# Patient Record
(demographics unavailable — no encounter records)

---

## 2025-03-03 NOTE — ASSESSMENT
[FreeTextEntry1] : Headaches and poor gait. Certas  shunt valve changed from 6 to 5 today to further address hydrocephalus seen on his head CT from December 2024.  - repeat NCHCT in 1 month and then return to clinic, may consider dialing his shunt down further pending head CT results -gabapentin for headaches  Patient and patient's family verbalize agreement and understanding with plan of care.  I, Dr. Luigi Jorgensen, personally performed the evaluation and management (E/M) services for this established patient who presents today with (a) new problem(s)/exacerbation of (an) existing condition(s). That E/M includes conducting the clinically appropriate interval history &/or exam, assessing all new/exacerbated conditions, and establishing a new plan of care. Today, my MARIA DOLORES, Gareth Vieira, was here to observe my evaluation and management service for this new problem/exacerbated condition and follow the plan of care established by me going forward.

## 2025-03-03 NOTE — DATA REVIEWED
[de-identified] : ACC: [id number] EXAM: CT BRAIN ORDERED BY: DONOVAN STEVENS @ PACS 12/9/2024  PROCEDURE DATE: 12/09/2024    INTERPRETATION: CLINICAL INFORMATION: r/o hemorrhage  COMPARISON: CT head 11/30/2024. Prior brain MRI study from 6/27/2023.  CONTRAST: IV Contrast: NONE .  TECHNIQUE: Serial axial images were obtained from the skull base to the vertex using multi-slice helical technique. Sagittal and coronal reformats were obtained.  FINDINGS:  VENTRICLES AND SULCI: Stable ventricular enlargement with right-sided frontal approach  shunt catheter terminating in the anterior third ventricle. INTRA-AXIAL: No mass effect, acute hemorrhage, or midline shift. Chronic area of encephalomalacia and gliosis within the left corona radiata. EXTRA-AXIAL: No mass or fluid collection. Basal cisterns are normal in appearance.  VISUALIZED SINUSES: Clear. TYMPANOMASTOID CAVITIES: Clear. VISUALIZED ORBITS: Normal. CALVARIUM: Right frontal elvira hole.  IMPRESSION: Stable ventricular enlargement since 11/30/2024 status post placement of a right frontal approach  shunt catheter. No acute hemorrhage.  --- End of Report ---

## 2025-03-03 NOTE — HISTORY OF PRESENT ILLNESS
[FreeTextEntry1] : Hospital admission (6/26/23- 7/3/23)  67 yo M former smoker, poor historian, with PMH hydrocephalus (s/p ETV Dr. Jorgensen, 7/2019), CAD (s/p CABG x3, and multiple PCIs, most recently received HUMA to [facility] in 7/2020 @ [facility]), PAD (s/p aortic-femoral stent), CKD stage 3 (baseline Cr 2.0), HTN, HLD, hx of bleeding peptic ulcer in 2018, p/w R-sided headache for 7 days, unrelieved by Tylenol. He also is complaining of significant dizziness, gait instability, and subjective L-sided arm and leg weakness. Pt has had multiple ER visits over past years with similar complaints, MRI CSF flow study in 2021 demonstrated patent ETV. CTH with stable ventriculomegaly.  He underwent ETV on 6/28/23  At postoperative visit, he was referred to urologist for urinary retention. He returns today for one month postop visit.  HE was recently admitted to [facility] due to witnessed seizure. Per son, the patient lost consciousness and started convulsing. He called EMS. Imaging of the head (which is not available at today's visit) demonstrated a small intraparenchymal hemorrhage near the elvira hole. He was discharged on seizure medications (keppra 500 mg bid) and instructed to stop ASA 81 mg.  Since discharge, the patient reports generalized weakness and unsteady gait. He denies seizures since discharge. Reports headaches, which are slightly better since preop.  Recent hospitalization for severe headache, vestibular symptoms and gait disturbance. brain image showed ventriculo,egaly and communicating hydrocephalus. He underwent VPS placement. Post op hospital stay was complicated by pseudogout flare, abdominal pain/distension (images negative for any acute pathology), PT recommended acute rehab but patient prefers to go home. Patient was discharged to home  7/8/24 post op vsit Today he reports doing well at home and denies fever/chills, worsening focal neuro deficits. Plan was made to continue PT for gait training and repeat CTH wo in 3 months  8/23/24 post op visit Today he states persistent headache that are improved since prior visit but denies any other focal neuro deficits. has not been able to get CTH. he ran out of gabapentin which had been helping his headaches. Shunt settint checked at 7 today. Plan was made to take gabapentin for headache and obtain CTH asap and return for further treatment options.  10/11/24 follow up visit Today he reports worsening headache and more drowsy since his last visit. He tried gabapentin with minimal relief. CTH was reviewed which showed worsening hydrocephalus. Shunt setting changed to 6 due to worsening symptoms and image finding. Plan was made to repeat CTH in 6 weeks  12/9/2024: NO SHOW - was planned to return to review NCHCT performed on 11/30; demonstrates no change, dilated ventricles consistent with NPH.   TODAY: experiencing persistent vertigo issues for the last 1 month as well as binocular double vision. he is also reporting significant headache in the right parietal area. Reviewed NCHCT perform on 12/9; given reported symptoms, Certas changed to @ 5 today.
[FreeTextEntry1] : Hospital admission (6/26/23- 7/3/23)  67 yo M former smoker, poor historian, with PMH hydrocephalus (s/p ETV Dr. Jorgensen, 7/2019), CAD (s/p CABG x3, and multiple PCIs, most recently received HUMA to [facility] in 7/2020 @ [facility]), PAD (s/p aortic-femoral stent), CKD stage 3 (baseline Cr 2.0), HTN, HLD, hx of bleeding peptic ulcer in 2018, p/w R-sided headache for 7 days, unrelieved by Tylenol. He also is complaining of significant dizziness, gait instability, and subjective L-sided arm and leg weakness. Pt has had multiple ER visits over past years with similar complaints, MRI CSF flow study in 2021 demonstrated patent ETV. CTH with stable ventriculomegaly.  He underwent ETV on 6/28/23  At postoperative visit, he was referred to urologist for urinary retention. He returns today for one month postop visit.  HE was recently admitted to [facility] due to witnessed seizure. Per son, the patient lost consciousness and started convulsing. He called EMS. Imaging of the head (which is not available at today's visit) demonstrated a small intraparenchymal hemorrhage near the elvira hole. He was discharged on seizure medications (keppra 500 mg bid) and instructed to stop ASA 81 mg.  Since discharge, the patient reports generalized weakness and unsteady gait. He denies seizures since discharge. Reports headaches, which are slightly better since preop.  Recent hospitalization for severe headache, vestibular symptoms and gait disturbance. brain image showed ventriculo,egaly and communicating hydrocephalus. He underwent VPS placement. Post op hospital stay was complicated by pseudogout flare, abdominal pain/distension (images negative for any acute pathology), PT recommended acute rehab but patient prefers to go home. Patient was discharged to home  7/8/24 post op vsit Today he reports doing well at home and denies fever/chills, worsening focal neuro deficits. Plan was made to continue PT for gait training and repeat CTH wo in 3 months  8/23/24 post op visit Today he states persistent headache that are improved since prior visit but denies any other focal neuro deficits. has not been able to get CTH. he ran out of gabapentin which had been helping his headaches. Shunt settint checked at 7 today. Plan was made to take gabapentin for headache and obtain CTH asap and return for further treatment options.  10/11/24 follow up visit Today he reports worsening headache and more drowsy since his last visit. He tried gabapentin with minimal relief. CTH was reviewed which showed worsening hydrocephalus. Shunt setting changed to 6 due to worsening symptoms and image finding. Plan was made to repeat CTH in 6 weeks  12/9/2024: NO SHOW - was planned to return to review NCHCT performed on 11/30; demonstrates no change, dilated ventricles consistent with NPH.   TODAY: experiencing persistent vertigo issues for the last 1 month as well as binocular double vision. he is also reporting significant headache in the right parietal area. Reviewed NCHCT perform on 12/9; given reported symptoms, Certas changed to @ 5 today.
[FreeTextEntry1] : Hospital admission (6/26/23- 7/3/23)  69 yo M former smoker, poor historian, with PMH hydrocephalus (s/p ETV Dr. Jorgensen, 7/2019), CAD (s/p CABG x3, and multiple PCIs, most recently received HUMA to [facility] in 7/2020 @ [facility]), PAD (s/p aortic-femoral stent), CKD stage 3 (baseline Cr 2.0), HTN, HLD, hx of bleeding peptic ulcer in 2018, p/w R-sided headache for 7 days, unrelieved by Tylenol. He also is complaining of significant dizziness, gait instability, and subjective L-sided arm and leg weakness. Pt has had multiple ER visits over past years with similar complaints, MRI CSF flow study in 2021 demonstrated patent ETV. CTH with stable ventriculomegaly.  He underwent ETV on 6/28/23  At postoperative visit, he was referred to urologist for urinary retention. He returns today for one month postop visit.  HE was recently admitted to [facility] due to witnessed seizure. Per son, the patient lost consciousness and started convulsing. He called EMS. Imaging of the head (which is not available at today's visit) demonstrated a small intraparenchymal hemorrhage near the elvira hole. He was discharged on seizure medications (keppra 500 mg bid) and instructed to stop ASA 81 mg.  Since discharge, the patient reports generalized weakness and unsteady gait. He denies seizures since discharge. Reports headaches, which are slightly better since preop.  Recent hospitalization for severe headache, vestibular symptoms and gait disturbance. brain image showed ventriculo,egaly and communicating hydrocephalus. He underwent VPS placement. Post op hospital stay was complicated by pseudogout flare, abdominal pain/distension (images negative for any acute pathology), PT recommended acute rehab but patient prefers to go home. Patient was discharged to home  7/8/24 post op vsit Today he reports doing well at home and denies fever/chills, worsening focal neuro deficits. Plan was made to continue PT for gait training and repeat CTH wo in 3 months  8/23/24 post op visit Today he states persistent headache that are improved since prior visit but denies any other focal neuro deficits. has not been able to get CTH. he ran out of gabapentin which had been helping his headaches. Shunt settint checked at 7 today. Plan was made to take gabapentin for headache and obtain CTH asap and return for further treatment options.  10/11/24 follow up visit Today he reports worsening headache and more drowsy since his last visit. He tried gabapentin with minimal relief. CTH was reviewed which showed worsening hydrocephalus. Shunt setting changed to 6 due to worsening symptoms and image finding. Plan was made to repeat CTH in 6 weeks  12/9/2024: NO SHOW - was planned to return to review NCHCT performed on 11/30; demonstrates no change, dilated ventricles consistent with NPH.   TODAY: experiencing persistent vertigo issues for the last 1 month as well as binocular double vision. he is also reporting significant headache in the right parietal area. Reviewed NCHCT perform on 12/9; given reported symptoms, Certas changed to @ 5 today.
14-Aug-2018

## 2025-03-03 NOTE — REASON FOR VISIT
[Follow-Up: _____] : a [unfilled] follow-up visit [FreeTextEntry1] : history of communicating hydrocephalus s/p VPS (Certas, last setting @ 6) [FreeTextEntry2] : 6/13/24

## 2025-03-03 NOTE — DATA REVIEWED
[de-identified] : ACC: [id number] EXAM: CT BRAIN ORDERED BY: DONOVAN STEVENS @ PACS 12/9/2024  PROCEDURE DATE: 12/09/2024    INTERPRETATION: CLINICAL INFORMATION: r/o hemorrhage  COMPARISON: CT head 11/30/2024. Prior brain MRI study from 6/27/2023.  CONTRAST: IV Contrast: NONE .  TECHNIQUE: Serial axial images were obtained from the skull base to the vertex using multi-slice helical technique. Sagittal and coronal reformats were obtained.  FINDINGS:  VENTRICLES AND SULCI: Stable ventricular enlargement with right-sided frontal approach  shunt catheter terminating in the anterior third ventricle. INTRA-AXIAL: No mass effect, acute hemorrhage, or midline shift. Chronic area of encephalomalacia and gliosis within the left corona radiata. EXTRA-AXIAL: No mass or fluid collection. Basal cisterns are normal in appearance.  VISUALIZED SINUSES: Clear. TYMPANOMASTOID CAVITIES: Clear. VISUALIZED ORBITS: Normal. CALVARIUM: Right frontal elvira hole.  IMPRESSION: Stable ventricular enlargement since 11/30/2024 status post placement of a right frontal approach  shunt catheter. No acute hemorrhage.  --- End of Report ---

## 2025-03-03 NOTE — DATA REVIEWED
[de-identified] : ACC: [id number] EXAM: CT BRAIN ORDERED BY: DONOVAN STEVENS @ PACS 12/9/2024  PROCEDURE DATE: 12/09/2024    INTERPRETATION: CLINICAL INFORMATION: r/o hemorrhage  COMPARISON: CT head 11/30/2024. Prior brain MRI study from 6/27/2023.  CONTRAST: IV Contrast: NONE .  TECHNIQUE: Serial axial images were obtained from the skull base to the vertex using multi-slice helical technique. Sagittal and coronal reformats were obtained.  FINDINGS:  VENTRICLES AND SULCI: Stable ventricular enlargement with right-sided frontal approach  shunt catheter terminating in the anterior third ventricle. INTRA-AXIAL: No mass effect, acute hemorrhage, or midline shift. Chronic area of encephalomalacia and gliosis within the left corona radiata. EXTRA-AXIAL: No mass or fluid collection. Basal cisterns are normal in appearance.  VISUALIZED SINUSES: Clear. TYMPANOMASTOID CAVITIES: Clear. VISUALIZED ORBITS: Normal. CALVARIUM: Right frontal elvira hole.  IMPRESSION: Stable ventricular enlargement since 11/30/2024 status post placement of a right frontal approach  shunt catheter. No acute hemorrhage.  --- End of Report ---

## 2025-03-24 NOTE — DATA REVIEWED
[de-identified] : 3/11/25 @ PACS ACC: [id number] EXAM: CT BRAIN ORDERED BY: KOLTON SHORT  PROCEDURE DATE: 03/11/2025    INTERPRETATION: CLINICAL INFORMATION: headaches, weakness. hx hydrocephalus w  shunt  COMPARISON: CT head 12/9/2024  CONTRAST: IV Contrast: NONE .  TECHNIQUE: Serial axial images were obtained from the skull base to the vertex using multi-slice helical technique. Sagittal and coronal reformats were obtained.  FINDINGS:  VENTRICLES AND SULCI: Stable ventricular enlargement with right-sided frontal approach  shunt catheter terminating in the anterior third ventricle. INTRA-AXIAL: No mass effect, acute hemorrhage, or midline shift. Chronic area of encephalomalacia/gliosis within the left corona radiata. No evidence of transependymal flow of CSF. EXTRA-AXIAL: No mass or fluid collection. Basal cisterns are normal in appearance.  VISUALIZED SINUSES: Clear. TYMPANOMASTOID CAVITIES: Clear. VISUALIZED ORBITS: Normal. CALVARIUM: Right frontal elvira hole  IMPRESSION: Unchanged trajectory and positioning of a right frontal approach intraventricular catheter. Ventriculomegaly is unchanged from the prior study of 12/9/2024. No acute intracranial hemorrhage, midline shift or infarct.   --- End of Report ---

## 2025-03-24 NOTE — HISTORY OF PRESENT ILLNESS
[FreeTextEntry1] : Hospital admission (6/26/23- 7/3/23)  69 yo M former smoker, poor historian, with PMH hydrocephalus (s/p ETV Dr. Jorgensen, 7/2019), CAD (s/p CABG x3, and multiple PCIs, most recently received HUMA to [facility] in 7/2020 @ [facility]), PAD (s/p aortic-femoral stent), CKD stage 3 (baseline Cr 2.0), HTN, HLD, hx of bleeding peptic ulcer in 2018, p/w R-sided headache for 7 days, unrelieved by Tylenol. He also is complaining of significant dizziness, gait instability, and subjective L-sided arm and leg weakness. Pt has had multiple ER visits over past years with similar complaints, MRI CSF flow study in 2021 demonstrated patent ETV. CTH with stable ventriculomegaly.  He underwent ETV on 6/28/23  At postoperative visit, he was referred to urologist for urinary retention. He returns today for one month postop visit.  HE was recently admitted to [facility] due to witnessed seizure. Per son, the patient lost consciousness and started convulsing. He called EMS. Imaging of the head (which is not available at today's visit) demonstrated a small intraparenchymal hemorrhage near the elvira hole. He was discharged on seizure medications (keppra 500 mg bid) and instructed to stop ASA 81 mg.  Since discharge, the patient reports generalized weakness and unsteady gait. He denies seizures since discharge. Reports headaches, which are slightly better since preop.  Recent hospitalization for severe headache, vestibular symptoms and gait disturbance. brain image showed ventriculo,egaly and communicating hydrocephalus. He underwent VPS placement. Post op hospital stay was complicated by pseudogout flare, abdominal pain/distension (images negative for any acute pathology), PT recommended acute rehab but patient prefers to go home. Patient was discharged to home  7/8/24 post op vsit Today he reports doing well at home and denies fever/chills, worsening focal neuro deficits. Plan was made to continue PT for gait training and repeat CTH wo in 3 months  8/23/24 post op visit Today he states persistent headache that are improved since prior visit but denies any other focal neuro deficits. has not been able to get CTH. he ran out of gabapentin which had been helping his headaches. Shunt settint checked at 7 today. Plan was made to take gabapentin for headache and obtain CTH asap and return for further treatment options.  10/11/24 follow up visit Today he reports worsening headache and more drowsy since his last visit. He tried gabapentin with minimal relief. CTH was reviewed which showed worsening hydrocephalus. Shunt setting changed to 6 due to worsening symptoms and image finding. Plan was made to repeat CTH in 6 weeks  12/9/2024: NO SHOW - was planned to return to review NCHCT performed on 11/30; demonstrates no change, dilated ventricles consistent with NPH.  3/5/2025 experiencing persistent vertigo issues for the last 1 month as well as binocular double vision. he is also reporting significant headache in the right parietal area. Reviewed NCHCT perform on 12/9; given reported symptoms, Certas changed to @ 5 today.  TODAY:

## 2025-05-19 NOTE — ASSESSMENT
[FreeTextEntry1] : Certas changed to 4.   - repeat NCHCT in 1 month (June 2025) - gabapentin refill sent - referral to physiatry provided for steroid injections to the knee - RTC once above imaging completed  Patient and patient's family verbalize agreement and understanding with plan of care.  I, Dr. Luigi Jorgensen, personally performed the evaluation and management (E/M) services for this established patient who presents today with (a) new problem(s)/exacerbation of (an) existing condition(s). That E/M includes conducting the clinically appropriate interval history &/or exam, assessing all new/exacerbated conditions, and establishing a new plan of care. Today, my MARIA DOLORES, Gareth Vieira, was here to observe my evaluation and management service for this new problem/exacerbated condition and follow the plan of care established by me going forward.

## 2025-05-19 NOTE — DATA REVIEWED
[de-identified] : 3/11/25 @ PACS ACC: [id number] EXAM: CT BRAIN ORDERED BY: KOLTON SHORT  PROCEDURE DATE: 03/11/2025    INTERPRETATION: CLINICAL INFORMATION: headaches, weakness. hx hydrocephalus w  shunt  COMPARISON: CT head 12/9/2024  CONTRAST: IV Contrast: NONE .  TECHNIQUE: Serial axial images were obtained from the skull base to the vertex using multi-slice helical technique. Sagittal and coronal reformats were obtained.  FINDINGS:  VENTRICLES AND SULCI: Stable ventricular enlargement with right-sided frontal approach  shunt catheter terminating in the anterior third ventricle. INTRA-AXIAL: No mass effect, acute hemorrhage, or midline shift. Chronic area of encephalomalacia/gliosis within the left corona radiata. No evidence of transependymal flow of CSF. EXTRA-AXIAL: No mass or fluid collection. Basal cisterns are normal in appearance.  VISUALIZED SINUSES: Clear. TYMPANOMASTOID CAVITIES: Clear. VISUALIZED ORBITS: Normal. CALVARIUM: Right frontal elvira hole  IMPRESSION: Unchanged trajectory and positioning of a right frontal approach intraventricular catheter. Ventriculomegaly is unchanged from the prior study of 12/9/2024. No acute intracranial hemorrhage, midline shift or infarct.   --- End of Report ---

## 2025-05-19 NOTE — DATA REVIEWED
[de-identified] : 3/11/25 @ PACS ACC: [id number] EXAM: CT BRAIN ORDERED BY: KOLTON SHORT  PROCEDURE DATE: 03/11/2025    INTERPRETATION: CLINICAL INFORMATION: headaches, weakness. hx hydrocephalus w  shunt  COMPARISON: CT head 12/9/2024  CONTRAST: IV Contrast: NONE .  TECHNIQUE: Serial axial images were obtained from the skull base to the vertex using multi-slice helical technique. Sagittal and coronal reformats were obtained.  FINDINGS:  VENTRICLES AND SULCI: Stable ventricular enlargement with right-sided frontal approach  shunt catheter terminating in the anterior third ventricle. INTRA-AXIAL: No mass effect, acute hemorrhage, or midline shift. Chronic area of encephalomalacia/gliosis within the left corona radiata. No evidence of transependymal flow of CSF. EXTRA-AXIAL: No mass or fluid collection. Basal cisterns are normal in appearance.  VISUALIZED SINUSES: Clear. TYMPANOMASTOID CAVITIES: Clear. VISUALIZED ORBITS: Normal. CALVARIUM: Right frontal elvira hole  IMPRESSION: Unchanged trajectory and positioning of a right frontal approach intraventricular catheter. Ventriculomegaly is unchanged from the prior study of 12/9/2024. No acute intracranial hemorrhage, midline shift or infarct.   --- End of Report ---

## 2025-05-19 NOTE — HISTORY OF PRESENT ILLNESS
[FreeTextEntry1] : Hospital admission (6/26/23- 7/3/23)  69 yo M former smoker, poor historian, with PMH hydrocephalus (s/p ETV Dr. Jorgensen, 7/2019), CAD (s/p CABG x3, and multiple PCIs, most recently received HUMA to [facility] in 7/2020 @ [facility]), PAD (s/p aortic-femoral stent), CKD stage 3 (baseline Cr 2.0), HTN, HLD, hx of bleeding peptic ulcer in 2018, p/w R-sided headache for 7 days, unrelieved by Tylenol. He also is complaining of significant dizziness, gait instability, and subjective L-sided arm and leg weakness. Pt has had multiple ER visits over past years with similar complaints, MRI CSF flow study in 2021 demonstrated patent ETV. CTH with stable ventriculomegaly.  He underwent ETV on 6/28/23  At postoperative visit, he was referred to urologist for urinary retention. He returns today for one month postop visit.  HE was recently admitted to [place] due to witnessed seizure. Per son, the patient lost consciousness and started convulsing. He called EMS. Imaging of the head (which is not available at today's visit) demonstrated a small intraparenchymal hemorrhage near the elvira hole. He was discharged on seizure medications (keppra 500 mg bid) and instructed to stop ASA 81 mg.  Since discharge, the patient reports generalized weakness and unsteady gait. He denies seizures since discharge. Reports headaches, which are slightly better since preop.  Recent hospitalization for severe headache, vestibular symptoms and gait disturbance. brain image showed ventriculo,egaly and communicating hydrocephalus. He underwent VPS placement. Post op hospital stay was complicated by pseudogout flare, abdominal pain/distension (images negative for any acute pathology), PT recommended acute rehab but patient prefers to go home. Patient was discharged to home  7/8/24 post op vsit Today he reports doing well at home and denies fever/chills, worsening focal neuro deficits. Plan was made to continue PT for gait training and repeat CTH wo in 3 months  8/23/24 post op visit Today he states persistent headache that are improved since prior visit but denies any other focal neuro deficits. has not been able to get CTH. he ran out of gabapentin which had been helping his headaches. Shunt settint checked at 7 today. Plan was made to take gabapentin for headache and obtain CTH asap and return for further treatment options.  10/11/24 follow up visit Today he reports worsening headache and more drowsy since his last visit. He tried gabapentin with minimal relief. CTH was reviewed which showed worsening hydrocephalus. Shunt setting changed to 6 due to worsening symptoms and image finding. Plan was made to repeat CTH in 6 weeks  12/9/2024: NO SHOW - was planned to return to review NCHCT performed on 11/30; demonstrates no change, dilated ventricles consistent with NPH.  3/3/2025 experiencing persistent vertigo issues for the last 1 month as well as binocular double vision. he is also reporting significant headache in the right parietal area. Reviewed NCHCT perform on 12/9; given reported symptoms, Certas changed to @ 5 today. Recommended repeat NCHCT in 1 month and return to clinic. Prescribed gabapentin for headaches.   TODAY: was seen in [facility] ED on March 11 for persistent headaches and malaise. He was noted to have lower extremity nonpitting edema on examination while in the ED and complaining of new right leg weakness. He reported during this ED visit that the headaches are occurring on the right side behind is ear as well as vertigo. He was discharged and recommended to increased gabapentin to TID. Certas confirmed at 5. Patient and wife continue to report right leg weakness/knee pain.

## 2025-05-19 NOTE — DATA REVIEWED
[de-identified] : 3/11/25 @ PACS ACC: [id number] EXAM: CT BRAIN ORDERED BY: KOLTON SHORT  PROCEDURE DATE: 03/11/2025    INTERPRETATION: CLINICAL INFORMATION: headaches, weakness. hx hydrocephalus w  shunt  COMPARISON: CT head 12/9/2024  CONTRAST: IV Contrast: NONE .  TECHNIQUE: Serial axial images were obtained from the skull base to the vertex using multi-slice helical technique. Sagittal and coronal reformats were obtained.  FINDINGS:  VENTRICLES AND SULCI: Stable ventricular enlargement with right-sided frontal approach  shunt catheter terminating in the anterior third ventricle. INTRA-AXIAL: No mass effect, acute hemorrhage, or midline shift. Chronic area of encephalomalacia/gliosis within the left corona radiata. No evidence of transependymal flow of CSF. EXTRA-AXIAL: No mass or fluid collection. Basal cisterns are normal in appearance.  VISUALIZED SINUSES: Clear. TYMPANOMASTOID CAVITIES: Clear. VISUALIZED ORBITS: Normal. CALVARIUM: Right frontal elvira hole  IMPRESSION: Unchanged trajectory and positioning of a right frontal approach intraventricular catheter. Ventriculomegaly is unchanged from the prior study of 12/9/2024. No acute intracranial hemorrhage, midline shift or infarct.   --- End of Report ---

## 2025-05-19 NOTE — HISTORY OF PRESENT ILLNESS
[FreeTextEntry1] : Hospital admission (6/26/23- 7/3/23)  69 yo M former smoker, poor historian, with PMH hydrocephalus (s/p ETV Dr. Jorgensen, 7/2019), CAD (s/p CABG x3, and multiple PCIs, most recently received HUMA to [facility] in 7/2020 @ [facility]), PAD (s/p aortic-femoral stent), CKD stage 3 (baseline Cr 2.0), HTN, HLD, hx of bleeding peptic ulcer in 2018, p/w R-sided headache for 7 days, unrelieved by Tylenol. He also is complaining of significant dizziness, gait instability, and subjective L-sided arm and leg weakness. Pt has had multiple ER visits over past years with similar complaints, MRI CSF flow study in 2021 demonstrated patent ETV. CTH with stable ventriculomegaly.  He underwent ETV on 6/28/23  At postoperative visit, he was referred to urologist for urinary retention. He returns today for one month postop visit.  HE was recently admitted to [facility] due to witnessed seizure. Per son, the patient lost consciousness and started convulsing. He called EMS. Imaging of the head (which is not available at today's visit) demonstrated a small intraparenchymal hemorrhage near the elvira hole. He was discharged on seizure medications (keppra 500 mg bid) and instructed to stop ASA 81 mg.  Since discharge, the patient reports generalized weakness and unsteady gait. He denies seizures since discharge. Reports headaches, which are slightly better since preop.  Recent hospitalization for severe headache, vestibular symptoms and gait disturbance. brain image showed ventriculo,egaly and communicating hydrocephalus. He underwent VPS placement. Post op hospital stay was complicated by pseudogout flare, abdominal pain/distension (images negative for any acute pathology), PT recommended acute rehab but patient prefers to go home. Patient was discharged to home  7/8/24 post op vsit Today he reports doing well at home and denies fever/chills, worsening focal neuro deficits. Plan was made to continue PT for gait training and repeat CTH wo in 3 months  8/23/24 post op visit Today he states persistent headache that are improved since prior visit but denies any other focal neuro deficits. has not been able to get CTH. he ran out of gabapentin which had been helping his headaches. Shunt settint checked at 7 today. Plan was made to take gabapentin for headache and obtain CTH asap and return for further treatment options.  10/11/24 follow up visit Today he reports worsening headache and more drowsy since his last visit. He tried gabapentin with minimal relief. CTH was reviewed which showed worsening hydrocephalus. Shunt setting changed to 6 due to worsening symptoms and image finding. Plan was made to repeat CTH in 6 weeks  12/9/2024: NO SHOW - was planned to return to review NCHCT performed on 11/30; demonstrates no change, dilated ventricles consistent with NPH.  3/3/2025 experiencing persistent vertigo issues for the last 1 month as well as binocular double vision. he is also reporting significant headache in the right parietal area. Reviewed NCHCT perform on 12/9; given reported symptoms, Certas changed to @ 5 today. Recommended repeat NCHCT in 1 month and return to clinic. Prescribed gabapentin for headaches.   TODAY: was seen in [facility] ED on March 11 for persistent headaches and malaise. He was noted to have lower extremity nonpitting edema on examination while in the ED and complaining of new right leg weakness. He reported during this ED visit that the headaches are occurring on the right side behind is ear as well as vertigo. He was discharged and recommended to increased gabapentin to TID. Certas confirmed at 5. Patient and wife continue to report right leg weakness/knee pain.

## 2025-05-19 NOTE — HISTORY OF PRESENT ILLNESS
[FreeTextEntry1] : Hospital admission (6/26/23- 7/3/23)  69 yo M former smoker, poor historian, with PMH hydrocephalus (s/p ETV Dr. Jorgensen, 7/2019), CAD (s/p CABG x3, and multiple PCIs, most recently received HUMA to [facility] in 7/2020 @ [facility]), PAD (s/p aortic-femoral stent), CKD stage 3 (baseline Cr 2.0), HTN, HLD, hx of bleeding peptic ulcer in 2018, p/w R-sided headache for 7 days, unrelieved by Tylenol. He also is complaining of significant dizziness, gait instability, and subjective L-sided arm and leg weakness. Pt has had multiple ER visits over past years with similar complaints, MRI CSF flow study in 2021 demonstrated patent ETV. CTH with stable ventriculomegaly.  He underwent ETV on 6/28/23  At postoperative visit, he was referred to urologist for urinary retention. He returns today for one month postop visit.  HE was recently admitted to [facility] due to witnessed seizure. Per son, the patient lost consciousness and started convulsing. He called EMS. Imaging of the head (which is not available at today's visit) demonstrated a small intraparenchymal hemorrhage near the elvira hole. He was discharged on seizure medications (keppra 500 mg bid) and instructed to stop ASA 81 mg.  Since discharge, the patient reports generalized weakness and unsteady gait. He denies seizures since discharge. Reports headaches, which are slightly better since preop.  Recent hospitalization for severe headache, vestibular symptoms and gait disturbance. brain image showed ventriculo,egaly and communicating hydrocephalus. He underwent VPS placement. Post op hospital stay was complicated by pseudogout flare, abdominal pain/distension (images negative for any acute pathology), PT recommended acute rehab but patient prefers to go home. Patient was discharged to home  7/8/24 post op vsit Today he reports doing well at home and denies fever/chills, worsening focal neuro deficits. Plan was made to continue PT for gait training and repeat CTH wo in 3 months  8/23/24 post op visit Today he states persistent headache that are improved since prior visit but denies any other focal neuro deficits. has not been able to get CTH. he ran out of gabapentin which had been helping his headaches. Shunt settint checked at 7 today. Plan was made to take gabapentin for headache and obtain CTH asap and return for further treatment options.  10/11/24 follow up visit Today he reports worsening headache and more drowsy since his last visit. He tried gabapentin with minimal relief. CTH was reviewed which showed worsening hydrocephalus. Shunt setting changed to 6 due to worsening symptoms and image finding. Plan was made to repeat CTH in 6 weeks  12/9/2024: NO SHOW - was planned to return to review NCHCT performed on 11/30; demonstrates no change, dilated ventricles consistent with NPH.  3/3/2025 experiencing persistent vertigo issues for the last 1 month as well as binocular double vision. he is also reporting significant headache in the right parietal area. Reviewed NCHCT perform on 12/9; given reported symptoms, Certas changed to @ 5 today. Recommended repeat NCHCT in 1 month and return to clinic. Prescribed gabapentin for headaches.   TODAY: was seen in [facility] ED on March 11 for persistent headaches and malaise. He was noted to have lower extremity nonpitting edema on examination while in the ED and complaining of new right leg weakness. He reported during this ED visit that the headaches are occurring on the right side behind is ear as well as vertigo. He was discharged and recommended to increased gabapentin to TID. Certas confirmed at 5. Patient and wife continue to report right leg weakness/knee pain.

## 2025-05-19 NOTE — HISTORY OF PRESENT ILLNESS
[FreeTextEntry1] : Hospital admission (6/26/23- 7/3/23)  69 yo M former smoker, poor historian, with PMH hydrocephalus (s/p ETV Dr. Jorgensen, 7/2019), CAD (s/p CABG x3, and multiple PCIs, most recently received HUMA to [facility] in 7/2020 @ [facility]), PAD (s/p aortic-femoral stent), CKD stage 3 (baseline Cr 2.0), HTN, HLD, hx of bleeding peptic ulcer in 2018, p/w R-sided headache for 7 days, unrelieved by Tylenol. He also is complaining of significant dizziness, gait instability, and subjective L-sided arm and leg weakness. Pt has had multiple ER visits over past years with similar complaints, MRI CSF flow study in 2021 demonstrated patent ETV. CTH with stable ventriculomegaly.  He underwent ETV on 6/28/23  At postoperative visit, he was referred to urologist for urinary retention. He returns today for one month postop visit.  HE was recently admitted to [facility] due to witnessed seizure. Per son, the patient lost consciousness and started convulsing. He called EMS. Imaging of the head (which is not available at today's visit) demonstrated a small intraparenchymal hemorrhage near the elvira hole. He was discharged on seizure medications (keppra 500 mg bid) and instructed to stop ASA 81 mg.  Since discharge, the patient reports generalized weakness and unsteady gait. He denies seizures since discharge. Reports headaches, which are slightly better since preop.  Recent hospitalization for severe headache, vestibular symptoms and gait disturbance. brain image showed ventriculo,egaly and communicating hydrocephalus. He underwent VPS placement. Post op hospital stay was complicated by pseudogout flare, abdominal pain/distension (images negative for any acute pathology), PT recommended acute rehab but patient prefers to go home. Patient was discharged to home  7/8/24 post op vsit Today he reports doing well at home and denies fever/chills, worsening focal neuro deficits. Plan was made to continue PT for gait training and repeat CTH wo in 3 months  8/23/24 post op visit Today he states persistent headache that are improved since prior visit but denies any other focal neuro deficits. has not been able to get CTH. he ran out of gabapentin which had been helping his headaches. Shunt settint checked at 7 today. Plan was made to take gabapentin for headache and obtain CTH asap and return for further treatment options.  10/11/24 follow up visit Today he reports worsening headache and more drowsy since his last visit. He tried gabapentin with minimal relief. CTH was reviewed which showed worsening hydrocephalus. Shunt setting changed to 6 due to worsening symptoms and image finding. Plan was made to repeat CTH in 6 weeks  12/9/2024: NO SHOW - was planned to return to review NCHCT performed on 11/30; demonstrates no change, dilated ventricles consistent with NPH.  3/3/2025 experiencing persistent vertigo issues for the last 1 month as well as binocular double vision. he is also reporting significant headache in the right parietal area. Reviewed NCHCT perform on 12/9; given reported symptoms, Certas changed to @ 5 today. Recommended repeat NCHCT in 1 month and return to clinic. Prescribed gabapentin for headaches.   TODAY: was seen in [facility] ED on March 11 for persistent headaches and malaise. He was noted to have lower extremity nonpitting edema on examination while in the ED and complaining of new right leg weakness. He reported during this ED visit that the headaches are occurring on the right side behind is ear as well as vertigo. He was discharged and recommended to increased gabapentin to TID. Certas confirmed at 5. Patient and wife continue to report right leg weakness/knee pain.

## 2025-05-19 NOTE — DATA REVIEWED
[de-identified] : 3/11/25 @ PACS ACC: [id number] EXAM: CT BRAIN ORDERED BY: KOLTON SHORT  PROCEDURE DATE: 03/11/2025    INTERPRETATION: CLINICAL INFORMATION: headaches, weakness. hx hydrocephalus w  shunt  COMPARISON: CT head 12/9/2024  CONTRAST: IV Contrast: NONE .  TECHNIQUE: Serial axial images were obtained from the skull base to the vertex using multi-slice helical technique. Sagittal and coronal reformats were obtained.  FINDINGS:  VENTRICLES AND SULCI: Stable ventricular enlargement with right-sided frontal approach  shunt catheter terminating in the anterior third ventricle. INTRA-AXIAL: No mass effect, acute hemorrhage, or midline shift. Chronic area of encephalomalacia/gliosis within the left corona radiata. No evidence of transependymal flow of CSF. EXTRA-AXIAL: No mass or fluid collection. Basal cisterns are normal in appearance.  VISUALIZED SINUSES: Clear. TYMPANOMASTOID CAVITIES: Clear. VISUALIZED ORBITS: Normal. CALVARIUM: Right frontal elvira hole  IMPRESSION: Unchanged trajectory and positioning of a right frontal approach intraventricular catheter. Ventriculomegaly is unchanged from the prior study of 12/9/2024. No acute intracranial hemorrhage, midline shift or infarct.   --- End of Report ---

## 2025-05-20 NOTE — PROCEDURE
[FreeTextEntry1] : Reprogram  shunt valve [FreeTextEntry2] : Hydrocephalus [FreeTextEntry3] : Shunt valve palpated in scalp.  tool placed over valve and it was dialed from 5 to 4. Patient tolerated it well.

## 2025-07-22 NOTE — DATA REVIEWED
[de-identified] : EXAM: [id number] - CT BRAIN  - ORDERED BY: JACQUE HOYT   PROCEDURE DATE:  07/17/2025    INTERPRETATION:  EXAM: CT HEAD WITHOUT INTRAVENOUS CONTRAST  HISTORY: Shunt.  TECHNIQUE: Volumetric axial images were obtained from the skull base to the vertex. Sagittal and coronal reformatted images were obtained . The images were reviewed in brain and bone windows.  COMPARISON: CT from 3/11/2025  FINDINGS:  Right frontal approach ventriculoperitoneal shunt catheter with tip in the third ventricle. Ventricular size and stable compared with prior study. No acute abnormality.  IMPRESSION:  Stable size of the ventricles.  --- End of Report ---       DONOVAN BREWER MD; Attending Radiologist This document has been electronically signed. Jul 17 2025 11:51AM

## 2025-07-22 NOTE — DATA REVIEWED
[de-identified] : EXAM: [id number] - CT BRAIN  - ORDERED BY: JACQUE HOYT   PROCEDURE DATE:  07/17/2025    INTERPRETATION:  EXAM: CT HEAD WITHOUT INTRAVENOUS CONTRAST  HISTORY: Shunt.  TECHNIQUE: Volumetric axial images were obtained from the skull base to the vertex. Sagittal and coronal reformatted images were obtained . The images were reviewed in brain and bone windows.  COMPARISON: CT from 3/11/2025  FINDINGS:  Right frontal approach ventriculoperitoneal shunt catheter with tip in the third ventricle. Ventricular size and stable compared with prior study. No acute abnormality.  IMPRESSION:  Stable size of the ventricles.  --- End of Report ---       DONOVAN BREWER MD; Attending Radiologist This document has been electronically signed. Jul 17 2025 11:51AM

## 2025-07-22 NOTE — HISTORY OF PRESENT ILLNESS
[FreeTextEntry1] : Hospital admission (6/26/23- 7/3/23)  69 yo M former smoker, poor historian, with PMH hydrocephalus (s/p ETV Dr. Jorgensen, 7/2019), CAD (s/p CABG x3, and multiple PCIs, most recently received HUMA to [facility] in 7/2020 @ [facility]), PAD (s/p aortic-femoral stent), CKD stage 3 (baseline Cr 2.0), HTN, HLD, hx of bleeding peptic ulcer in 2018, p/w R-sided headache for 7 days, unrelieved by Tylenol. He also is complaining of significant dizziness, gait instability, and subjective L-sided arm and leg weakness. Pt has had multiple ER visits over past years with similar complaints, MRI CSF flow study in 2021 demonstrated patent ETV. CTH with stable ventriculomegaly.  He underwent ETV on 6/28/23  At postoperative visit, he was referred to urologist for urinary retention. He returns today for one month postop visit.  HE was recently admitted to [facility] due to witnessed seizure. Per son, the patient lost consciousness and started convulsing. He called EMS. Imaging of the head (which is not available at today's visit) demonstrated a small intraparenchymal hemorrhage near the elvira hole. He was discharged on seizure medications (keppra 500 mg bid) and instructed to stop ASA 81 mg.  Since discharge, the patient reports generalized weakness and unsteady gait. He denies seizures since discharge. Reports headaches, which are slightly better since preop.  Recent hospitalization for severe headache, vestibular symptoms and gait disturbance. brain image showed ventriculo,egaly and communicating hydrocephalus. He underwent VPS placement. Post op hospital stay was complicated by pseudogout flare, abdominal pain/distension (images negative for any acute pathology), PT recommended acute rehab but patient prefers to go home. Patient was discharged to home  7/8/24 post op vsit Today he reports doing well at home and denies fever/chills, worsening focal neuro deficits. Plan was made to continue PT for gait training and repeat CTH wo in 3 months  8/23/24 post op visit Today he states persistent headache that are improved since prior visit but denies any other focal neuro deficits. has not been able to get CTH. he ran out of gabapentin which had been helping his headaches. Shunt settint checked at 7 today. Plan was made to take gabapentin for headache and obtain CTH asap and return for further treatment options.  10/11/24 follow up visit Today he reports worsening headache and more drowsy since his last visit. He tried gabapentin with minimal relief. CTH was reviewed which showed worsening hydrocephalus. Shunt setting changed to 6 due to worsening symptoms and image finding. Plan was made to repeat CTH in 6 weeks  12/9/2024: NO SHOW - was planned to return to review NCHCT performed on 11/30; demonstrates no change, dilated ventricles consistent with NPH.  3/3/2025 experiencing persistent vertigo issues for the last 1 month as well as binocular double vision. he is also reporting significant headache in the right parietal area. Reviewed NCHCT perform on 12/9; given reported symptoms, Certas changed to @ 5 today. Recommended repeat NCHCT in 1 month and return to clinic. Prescribed gabapentin for headaches.  5/19/2025: was seen in [facility] ED on March 11 for persistent headaches and malaise. He was noted to have lower extremity nonpitting edema on examination while in the ED and complaining of new right leg weakness. He reported during this ED visit that the headaches are occurring on the right side behind is ear as well as vertigo. He was discharged and recommended to increased gabapentin to TID. Certas confirmed at 5. Patient and wife continue to report right leg weakness/knee pain. Certas changed to 4 today. Plan to repeat NCHCT in 1 month (June 2025), referred to physiatry for steroid injections to knee

## 2025-07-22 NOTE — HISTORY OF PRESENT ILLNESS
[FreeTextEntry1] : Hospital admission (6/26/23- 7/3/23)  69 yo M former smoker, poor historian, with PMH hydrocephalus (s/p ETV Dr. Jorgensen, 7/2019), CAD (s/p CABG x3, and multiple PCIs, most recently received HUMA to [facility] in 7/2020 @ [facility]), PAD (s/p aortic-femoral stent), CKD stage 3 (baseline Cr 2.0), HTN, HLD, hx of bleeding peptic ulcer in 2018, p/w R-sided headache for 7 days, unrelieved by Tylenol. He also is complaining of significant dizziness, gait instability, and subjective L-sided arm and leg weakness. Pt has had multiple ER visits over past years with similar complaints, MRI CSF flow study in 2021 demonstrated patent ETV. CTH with stable ventriculomegaly.  He underwent ETV on 6/28/23  At postoperative visit, he was referred to urologist for urinary retention. He returns today for one month postop visit.  HE was recently admitted to [facility] due to witnessed seizure. Per son, the patient lost consciousness and started convulsing. He called EMS. Imaging of the head (which is not available at today's visit) demonstrated a small intraparenchymal hemorrhage near the elvira hole. He was discharged on seizure medications (keppra 500 mg bid) and instructed to stop ASA 81 mg.  Since discharge, the patient reports generalized weakness and unsteady gait. He denies seizures since discharge. Reports headaches, which are slightly better since preop.  Recent hospitalization for severe headache, vestibular symptoms and gait disturbance. brain image showed ventriculo,egaly and communicating hydrocephalus. He underwent VPS placement. Post op hospital stay was complicated by pseudogout flare, abdominal pain/distension (images negative for any acute pathology), PT recommended acute rehab but patient prefers to go home. Patient was discharged to home  7/8/24 post op vsit Today he reports doing well at home and denies fever/chills, worsening focal neuro deficits. Plan was made to continue PT for gait training and repeat CTH wo in 3 months  8/23/24 post op visit Today he states persistent headache that are improved since prior visit but denies any other focal neuro deficits. has not been able to get CTH. he ran out of gabapentin which had been helping his headaches. Shunt settint checked at 7 today. Plan was made to take gabapentin for headache and obtain CTH asap and return for further treatment options.  10/11/24 follow up visit Today he reports worsening headache and more drowsy since his last visit. He tried gabapentin with minimal relief. CTH was reviewed which showed worsening hydrocephalus. Shunt setting changed to 6 due to worsening symptoms and image finding. Plan was made to repeat CTH in 6 weeks  12/9/2024: NO SHOW - was planned to return to review NCHCT performed on 11/30; demonstrates no change, dilated ventricles consistent with NPH.  3/3/2025 experiencing persistent vertigo issues for the last 1 month as well as binocular double vision. he is also reporting significant headache in the right parietal area. Reviewed NCHCT perform on 12/9; given reported symptoms, Certas changed to @ 5 today. Recommended repeat NCHCT in 1 month and return to clinic. Prescribed gabapentin for headaches.  5/19/2025: was seen in [facility] ED on March 11 for persistent headaches and malaise. He was noted to have lower extremity nonpitting edema on examination while in the ED and complaining of new right leg weakness. He reported during this ED visit that the headaches are occurring on the right side behind is ear as well as vertigo. He was discharged and recommended to increased gabapentin to TID. Certas confirmed at 5. Patient and wife continue to report right leg weakness/knee pain. Certas changed to 4 today. Plan to repeat NCHCT in 1 month (June 2025), referred to physiatry for steroid injections to knee